# Patient Record
Sex: FEMALE | Race: WHITE
[De-identification: names, ages, dates, MRNs, and addresses within clinical notes are randomized per-mention and may not be internally consistent; named-entity substitution may affect disease eponyms.]

---

## 2017-01-30 ENCOUNTER — HOSPITAL ENCOUNTER (EMERGENCY)
Dept: HOSPITAL 61 - ER | Age: 67
Discharge: HOME | End: 2017-01-30
Payer: MEDICARE

## 2017-01-30 VITALS — WEIGHT: 130 LBS | HEIGHT: 64 IN | BODY MASS INDEX: 22.2 KG/M2

## 2017-01-30 VITALS — DIASTOLIC BLOOD PRESSURE: 69 MMHG | SYSTOLIC BLOOD PRESSURE: 159 MMHG

## 2017-01-30 DIAGNOSIS — R50.9: Primary | ICD-10-CM

## 2017-01-30 DIAGNOSIS — R41.82: ICD-10-CM

## 2017-01-30 DIAGNOSIS — E11.9: ICD-10-CM

## 2017-01-30 DIAGNOSIS — I10: ICD-10-CM

## 2017-01-30 DIAGNOSIS — Z95.1: ICD-10-CM

## 2017-01-30 LAB
ALBUMIN SERPL-MCNC: 3.5 G/DL (ref 3.4–5)
ALP SERPL-CCNC: 60 U/L (ref 46–116)
ALT SERPL-CCNC: 23 U/L (ref 14–59)
ANION GAP SERPL CALC-SCNC: 8 MMOL/L (ref 6–14)
APTT BLD: 33 SEC (ref 24–38)
AST SERPL-CCNC: 24 U/L (ref 15–37)
BACTERIA #/AREA URNS HPF: (no result) /HPF
BARBITURATES UR-MCNC: (no result) UG/ML
BASOPHILS # BLD AUTO: 0 X10^3/UL (ref 0–0.2)
BASOPHILS NFR BLD: 0 % (ref 0–3)
BENZODIAZ UR-MCNC: (no result) UG/L
BILIRUB DIRECT SERPL-MCNC: 0.1 MG/DL (ref 0–0.2)
BILIRUB SERPL-MCNC: 0.5 MG/DL (ref 0.2–1)
BILIRUB UR QL STRIP: NEGATIVE
BUN SERPL-MCNC: 11 MG/DL (ref 7–20)
CALCIUM SERPL-MCNC: 8.8 MG/DL (ref 8.5–10.1)
CANNABINOIDS UR-MCNC: (no result) UG/L
CHLORIDE SERPL-SCNC: 97 MMOL/L (ref 98–107)
CO2 SERPL-SCNC: 30 MMOL/L (ref 21–32)
COCAINE UR-MCNC: (no result) NG/ML
CREAT SERPL-MCNC: 0.9 MG/DL (ref 0.6–1)
EOSINOPHIL NFR BLD: 0 % (ref 0–3)
ERYTHROCYTE [DISTWIDTH] IN BLOOD BY AUTOMATED COUNT: 13.1 % (ref 11.5–14.5)
ETHANOL, URINE: (no result)
GFR SERPLBLD BASED ON 1.73 SQ M-ARVRAT: 62.5 ML/MIN
GLUCOSE SERPL-MCNC: 235 MG/DL (ref 70–99)
GLUCOSE UR STRIP-MCNC: 500 MG/DL
HCT VFR BLD CALC: 37.1 % (ref 36–47)
HGB BLD-MCNC: 12.5 G/DL (ref 12–15.5)
INR PPP: 1.1 (ref 0.8–1.1)
LYMPHOCYTES # BLD: 0.6 X10^3/UL (ref 1–4.8)
LYMPHOCYTES NFR BLD AUTO: 11 % (ref 24–48)
MCH RBC QN AUTO: 30 PG (ref 25–35)
MCHC RBC AUTO-ENTMCNC: 34 G/DL (ref 31–37)
MCV RBC AUTO: 89 FL (ref 79–100)
METHADONE SERPL-MCNC: (no result) NG/ML
MONOCYTES NFR BLD: 9 % (ref 0–9)
NEUTROPHILS NFR BLD AUTO: 80 % (ref 31–73)
NITRITE UR QL STRIP: POSITIVE
OBC FLU: (no result)
OPIATES UR-MCNC: (no result) NG/ML
PCP SERPL-MCNC: (no result) MG/DL
PH UR STRIP: 5.5 [PH]
PLATELET # BLD AUTO: 166 X10^3/UL (ref 140–400)
POTASSIUM SERPL-SCNC: 3.9 MMOL/L (ref 3.5–5.1)
PROT SERPL-MCNC: 7.6 G/DL (ref 6.4–8.2)
PROT UR STRIP-MCNC: NEGATIVE MG/DL
PROTHROMBIN TIME: 13.8 SEC (ref 11.7–14)
RBC # BLD AUTO: 4.19 X10^6/UL (ref 3.5–5.4)
RBC #/AREA URNS HPF: (no result) /HPF (ref 0–2)
SODIUM SERPL-SCNC: 135 MMOL/L (ref 136–145)
SP GR UR STRIP: 1.01
SQUAMOUS #/AREA URNS LPF: (no result) /LPF
UROBILINOGEN UR-MCNC: 0.2 MG/DL
WBC # BLD AUTO: 4.9 X10^3/UL (ref 4–11)
WBC #/AREA URNS HPF: (no result) /HPF (ref 0–4)

## 2017-01-30 PROCEDURE — 71010: CPT

## 2017-01-30 PROCEDURE — 87086 URINE CULTURE/COLONY COUNT: CPT

## 2017-01-30 PROCEDURE — 93005 ELECTROCARDIOGRAM TRACING: CPT

## 2017-01-30 PROCEDURE — 87804 INFLUENZA ASSAY W/OPTIC: CPT

## 2017-01-30 PROCEDURE — 96365 THER/PROPH/DIAG IV INF INIT: CPT

## 2017-01-30 PROCEDURE — 96361 HYDRATE IV INFUSION ADD-ON: CPT

## 2017-01-30 PROCEDURE — 80076 HEPATIC FUNCTION PANEL: CPT

## 2017-01-30 PROCEDURE — 85027 COMPLETE CBC AUTOMATED: CPT

## 2017-01-30 PROCEDURE — 70450 CT HEAD/BRAIN W/O DYE: CPT

## 2017-01-30 PROCEDURE — 36415 COLL VENOUS BLD VENIPUNCTURE: CPT

## 2017-01-30 PROCEDURE — 80048 BASIC METABOLIC PNL TOTAL CA: CPT

## 2017-01-30 PROCEDURE — 85610 PROTHROMBIN TIME: CPT

## 2017-01-30 PROCEDURE — 81001 URINALYSIS AUTO W/SCOPE: CPT

## 2017-01-30 PROCEDURE — 84484 ASSAY OF TROPONIN QUANT: CPT

## 2017-01-30 PROCEDURE — 85730 THROMBOPLASTIN TIME PARTIAL: CPT

## 2017-01-30 NOTE — ED.ADGEN
Past Medical History


Past Medical History:  Diabetes-Type II, Hypertension


Past Surgical History:  Coronary Bypass Surgery


Alcohol Use:  None


Drug Use:  None





Adult General


Chief Complaint


Chief Complaint:  FEVER





HPI


HPI


Patient is a 66  year old female presents emergency department accompanied by 

her friend for fever and confusion. Patient was at work today and coworkers 

noticed that she had increased confusion. She was found to have a fever. She 

only tells me that she has not been feeling well but denies any other complaint 

at this time. I do not find her to be a reliable historian at the moment and 

her friend agrees.





Review of Systems


Review of Systems


Constitutional:  Denies fever or chills. []


Eyes:  Denies change in visual acuity. []


HENT:  Denies nasal congestion or sore throat. [] 


Respiratory:  Denies cough or shortness of breath. [] 


Cardiovascular:  Denies chest pain or edema. [] 


GI:  Denies abdominal pain, nausea, vomiting, bloody stools or diarrhea. [] 


:  Denies dysuria. [] 


Musculoskeletal:  Denies back pain or joint pain. [] 


Integument:  Denies rash. [] 


Neurologic:  Denies headache, focal weakness or sensory changes. [] 


Endocrine:  Denies polyuria or polydipsia. [] 


Lymphatic:  Denies swollen glands. [] 


Psychiatric:  Denies depression or anxiety. []





Current Medications


Current Medications





 Current Medications








 Medications


  (Trade)  Dose


 Ordered  Sig/Corrie  Start Time


 Stop Time Status Last Admin


Dose Admin


 


 Acetaminophen


  (Tylenol)  1,000 mg  1X  ONCE  1/30/17 17:45


 1/30/17 17:46 DC 1/30/17 18:33


1,000 MG


 


 Ceftriaxone Sodium


  (Rocephin 1gm


 Ivpb For Omni)  50 ml @ 


 100 mls/hr  1X  ONCE  1/30/17 19:00


 1/30/17 19:29 DC 1/30/17 19:12


100 MLS/HR


 


 Ibuprofen


  (Motrin)  800 mg  1X  ONCE  1/30/17 19:15


 1/30/17 19:16 DC 1/30/17 19:12


800 MG


 


 Oseltamivir


 Phosphate 75 mg  75 mg  1X  STAT  1/30/17 18:41


 1/30/17 18:43 DC 1/30/17 19:00


75 MG


 


 Sodium Chloride


  (Iv Sodium


 Chloride 0.9%


 1000ml Bag)  1,000 ml @ 


 1,000 mls/hr  Q1H  1/30/17 17:39


 1/30/17 18:38 DC 1/30/17 18:33


1,000 MLS/HR











Allergies


Allergies





 Allergies








Coded Allergies Type Severity Reaction Last Updated Verified


 


  No Known Drug Allergies    8/18/15 No











Physical Exam


Physical Exam





Constitutional: Well developed, well nourished, no acute distress, non-toxic 

appearance. []


HENT: Normocephalic, atraumatic, bilateral external ears normal, oropharynx 

moist, no oral exudates, nose normal. []


Eyes: PERRLA, EOMI, conjunctiva normal, no discharge. [] 


Neck: Normal range of motion, no tenderness, supple, no stridor. [] 


Cardiovascular:Heart rate regular rhythm, no murmur []


Lungs & Thorax:  Bilateral breath sounds clear to auscultation []


Abdomen: Bowel sounds normal, soft, no tenderness, no masses, no pulsatile 

masses. [] 


Skin: Warm, dry, no erythema, no rash. [] 


Back: No tenderness, no CVA tenderness. [] 


Extremities: No tenderness, no cyanosis, no clubbing, ROM intact, no edema. [] 


Neurologic: Alert and oriented X 1, normal motor function, normal sensory 

function, no focal deficits noted. []


Psychologic: Affect normal, judgement confused, mood normal. []





Current Patient Data


Vital Signs





 Vital Signs








  Date Time  Temp Pulse Resp B/P Pulse Ox O2 Delivery O2 Flow Rate FiO2


 


1/30/17 20:08 100.6       





 100.6       


 


1/30/17 19:38  92 18 167/74 97 Nasal Cannula 2 








Lab Values





 Laboratory Tests








Test


  1/30/17


17:29 1/30/17


18:00 1/30/17


18:30 1/30/17


18:36


 


White Blood Count


  4.9x10^3/uL


(4.0-11.0) 


  


  


 


 


Red Blood Count


  4.19x10^6/uL


(3.50-5.40) 


  


  


 


 


Hemoglobin


  12.5g/dL


(12.0-15.5) 


  


  


 


 


Hematocrit


  37.1%


(36.0-47.0) 


  


  


 


 


Mean Corpuscular Volume 89fL ()     


 


Mean Corpuscular Hemoglobin 30pg (25-35)     


 


Mean Corpuscular Hemoglobin


Concent 34g/dL (31-37)


  


  


  


 


 


Red Cell Distribution Width


  13.1%


(11.5-14.5) 


  


  


 


 


Platelet Count


  166x10^3/uL


(140-400) 


  


  


 


 


Neutrophils (%) (Auto) 80% (31-73)  H   


 


Lymphocytes (%) (Auto) 11% (24-48)  L   


 


Monocytes (%) (Auto) 9% (0-9)     


 


Eosinophils (%) (Auto) 0% (0-3)     


 


Basophils (%) (Auto) 0% (0-3)     


 


Neutrophils # (Auto)


  3.9x10^3uL


(1.8-7.7) 


  


  


 


 


Lymphocytes # (Auto)


  0.6x10^3/uL


(1.0-4.8)  L 


  


  


 


 


Monocytes # (Auto)


  0.4x10^3/uL


(0.0-1.1) 


  


  


 


 


Eosinophils # (Auto)


  0.0x10^3/uL


(0.0-0.7) 


  


  


 


 


Basophils # (Auto)


  0.0x10^3/uL


(0.0-0.2) 


  


  


 


 


Sodium Level


  135mmol/L


(136-145)  L 


  


  


 


 


Potassium Level


  3.9mmol/L


(3.5-5.1) 


  


  


 


 


Chloride Level


  97mmol/L


()  L 


  


  


 


 


Carbon Dioxide Level


  30mmol/L


(21-32) 


  


  


 


 


Anion Gap 8 (6-14)     


 


Blood Urea Nitrogen


  11mg/dL (7-20)


  


  


  


 


 


Creatinine


  0.9mg/dL


(0.6-1.0) 


  


  


 


 


Estimated GFR


(Cockcroft-Gault) 62.5  


  


  


  


 


 


Glucose Level


  235mg/dL


(70-99)  H 


  


  


 


 


Calcium Level


  8.8mg/dL


(8.5-10.1) 


  


  


 


 


Total Bilirubin


  0.5mg/dL


(0.2-1.0) 


  


  


 


 


Direct Bilirubin


  0.1mg/dL


(0.0-0.2) 


  


  


 


 


Aspartate Amino Transferase


(AST) 24U/L (15-37)  


  


  


  


 


 


Alanine Aminotransferase (ALT) 23U/L (14-59)     


 


Alkaline Phosphatase


  60U/L ()


  


  


  


 


 


Troponin I Quantitative


  < 0.017ng/mL


(0.000-0.055) 


  


  


 


 


Total Protein


  7.6g/dL


(6.4-8.2) 


  


  


 


 


Albumin


  3.5g/dL


(3.4-5.0) 


  


  


 


 


Influenza Type A Antigen


  


  Positive


(NEGATIVE) 


  


 


 


Influenza Type B Antigen


  


  Negative


(NEGATIVE) 


  


 


 


Urine Collection Type   U cath   


 


Urine Color   Yellow   


 


Urine Clarity   Clear   


 


Urine pH   5.5   


 


Urine Specific Gravity   1.010   


 


Urine Protein


  


  


  Negativemg/dL


(NEG-TRACE) 


 


 


Urine Glucose (UA)


  


  


  500mg/dL (NEG)


  


 


 


Urine Ketones (Stick)


  


  


  Negativemg/dL


(NEG) 


 


 


Urine Blood   Small (NEG)   


 


Urine Nitrite


  


  


  Positive (NEG)


  


 


 


Urine Bilirubin


  


  


  Negative (NEG)


  


 


 


Urine Urobilinogen Dipstick


  


  


  0.2mg/dL (0.2


mg/dL) 


 


 


Urine Leukocyte Esterase   Trace (NEG)   


 


Urine RBC   Occ/HPF (0-2)   


 


Urine WBC   1-4/HPF (0-4)   


 


Urine Squamous Epithelial


Cells 


  


  Few/LPF  


  


 


 


Urine Transitional Epithelial


Cells 


  


  Occ/LPF  


  


 


 


Urine Bacteria


  


  


  Many/HPF


(0-FEW) 


 


 


Urine Mucus   Slight/LPF   


 


Urine Opiates Screen   Pos (NEG)   


 


Urine Methadone Screen   Neg (NEG)   


 


Urine Barbiturates   Neg (NEG)   


 


Urine Phencyclidine Screen   Neg (NEG)   


 


Urine


Amphetamine/Methamphetamine 


  


  Neg (NEG)  


  


 


 


Urine Benzodiazepines Screen   Neg (NEG)   


 


Urine Cocaine Screen   Neg (NEG)   


 


Urine Cannabinoids Screen   Neg (NEG)   


 


Urine Ethyl Alcohol   Neg (NEG)   


 


Prothrombin Time


  


  


  


  13.8SEC


(11.7-14.0)


 


Prothrombin Time INR    1.1 (0.8-1.1)  


 


PTT    33SEC (24-38)  





 Laboratory Tests


1/30/17 17:29








 Laboratory Tests


1/30/17 17:29














EKG


EKG


EKG interpreted by me, 98 beats for minute, normal sinus rhythm, no ST segment 

elevation normal axis. []





Radiology/Procedures


Radiology/Procedures


Chest x-ray interpreted by me, no acute cardiopulmonary process.








CT head without contrast 


Indication: Altered mental status. 


Axial imaging through the brain was performed without contrast. 


The ventricles and sulci are within normal limits. No sulcal effacement, 


midline shift or hemorrhage is seen. There is moderate periventricular 


hypodensity noted consistent with chronic microvascular ischemia. The 


cisterns are patent. The visualized paranasal sinuses are clear. 


Impression: No acute intracranial process is detected. 


 


Electronically signed by: Daniel Lynch MD (Jan 30, 2017 18:33:46)














DICTATED and SIGNED BY:     DANIEL LYNCH MD


DATE:     01/30/17 1832





CC: LEANA PALM MD; SARINA TORRE MD ~[]





Course & Med Decision Making


Course & Med Decision Making


Pertinent Labs and Imaging studies reviewed. (See chart for details)


She is positive for influenza. Urine is also suggestive of a urinary tract 

infection. We have repeated her with IV fluids, Tylenol, ibuprofen, and Tamiflu 

as well as a dose of Rocephin.





As her temperature has gone down the patient has returned to her normal 

baseline mental status. Son and  are now in the room with her. She lives 

with them. They would like to take her home and attempt outpatient treatment. I 

did stress to them that they should have a low threshold for returning to the 

emergency department to get her admitted. We spent a significant amount time 

talking about supportive care and return precautions.


[]





Dragon Disclaimer


Dragon Disclaimer


This electronic medical record was generated, in whole or in part, using a 

voice recognition dictation system.








LEANA PALM MD Jan 30, 2017 19:16

## 2017-01-30 NOTE — EKG
[facility]

              [address] [place] [address]

Test Date:    2017               Test Time:    17:57:17

Pat Name:     LAKESHIA FUENTES             Department:   

Patient ID:   [id number]           Room:          

Gender:       F                        Technician:   

:          [id number]               Requested By: LEANA PALM

Order Number: [id number]            Reading MD:   Gerardo Chambers

                                 Measurements

Intervals                              Axis          

Rate:         98                       P:            37

WY:           174                      QRS:          14

QRSD:         72                       T:            24

QT:           304                                    

QTc:          390                                    

                           Interpretive Statements

SINUS RHYTHM

NON-SPECIFIC ST/T CHANGES

Electronically Signed On 2017 7:45:21 CST by Gerardo Chambers

## 2017-01-30 NOTE — RAD
CT head without contrast 

Indication: Altered mental status. 

Axial imaging through the brain was performed without contrast. 

The ventricles and sulci are within normal limits. No sulcal effacement, 

midline shift or hemorrhage is seen. There is moderate periventricular 

hypodensity noted consistent with chronic microvascular ischemia. The 

cisterns are patent. The visualized paranasal sinuses are clear. 

Impression: No acute intracranial process is detected. 

 

Electronically signed by: Daniel Lynch MD (Jan 30, 2017 18:33:46)

## 2017-01-31 NOTE — RAD
Portable chest, 1/30/2017:



History: Fever, altered mental status



Comparison is made to a study from 5/5/2013. The heart size and pulmonary

vascularity are normal. There is calcific plaquing of the aorta. No pulmonary

infiltrates are seen. There is no evidence of pleural fluid. Moderate spurring

is present in the spine.



IMPRESSION: No acute cardiopulmonary abnormality is detected.

## 2019-03-22 ENCOUNTER — HOSPITAL ENCOUNTER (OUTPATIENT)
Dept: HOSPITAL 63 - SURG | Age: 69
Discharge: HOME | End: 2019-03-22
Attending: INTERNAL MEDICINE
Payer: COMMERCIAL

## 2019-03-22 VITALS — SYSTOLIC BLOOD PRESSURE: 148 MMHG | DIASTOLIC BLOOD PRESSURE: 87 MMHG

## 2019-03-22 DIAGNOSIS — K44.9: ICD-10-CM

## 2019-03-22 DIAGNOSIS — Z96.649: ICD-10-CM

## 2019-03-22 DIAGNOSIS — E11.9: ICD-10-CM

## 2019-03-22 DIAGNOSIS — I10: ICD-10-CM

## 2019-03-22 DIAGNOSIS — Z79.84: ICD-10-CM

## 2019-03-22 DIAGNOSIS — K22.2: Primary | ICD-10-CM

## 2019-03-22 DIAGNOSIS — K29.00: ICD-10-CM

## 2019-03-22 DIAGNOSIS — K21.0: ICD-10-CM

## 2019-03-22 DIAGNOSIS — Z98.890: ICD-10-CM

## 2019-03-22 DIAGNOSIS — Z90.710: ICD-10-CM

## 2019-03-22 DIAGNOSIS — Z79.899: ICD-10-CM

## 2019-03-22 DIAGNOSIS — K29.50: ICD-10-CM

## 2019-03-22 DIAGNOSIS — I25.2: ICD-10-CM

## 2019-03-22 PROCEDURE — 82947 ASSAY GLUCOSE BLOOD QUANT: CPT

## 2019-03-22 PROCEDURE — 88305 TISSUE EXAM BY PATHOLOGIST: CPT

## 2019-03-22 PROCEDURE — 43450 DILATE ESOPHAGUS 1/MULT PASS: CPT

## 2019-03-22 PROCEDURE — 43239 EGD BIOPSY SINGLE/MULTIPLE: CPT

## 2019-03-22 PROCEDURE — 88342 IMHCHEM/IMCYTCHM 1ST ANTB: CPT

## 2019-03-26 NOTE — PATHOLOGY
[facility] Accession Number: [id number]

.                                                                01

Material submitted:                                        .

ANTRUM BX

.                                                                01

Clinical history:                                          .

None provided

.                                                                02

**********************************************************************

Diagnosis:

Gastric biopsy, antrum:

- Chronic gastritis, mild, focally active.

(JPM:licha; 03/26/2019)

QMS/03/26/2019

**********************************************************************

.                                                                02

Comment:

Sections of the gastric antral biopsy show congestion and focally active

mild chronic inflammation.  A properly controlled immunoperoxidase stain

for Helicobacter is negative for Helicobacter organisms.  There is no

evidence of malignancy.

(JPM:licha; 03/26/2019)

.

.

Special stain:  Immunoperoxidase stain for Helicobacter.

.                                                                02

Electronically signed:                                     .

Epifanio Hennessy MD, Pathologist

NPI- [id number]

.                                                                01

Gross description:                                         .

Received in formalin labeled "Donnie Duvall, antrum BX," are two segments

of tan-brown soft tissue measuring 0.2 x 0.1 x 0.1 cm and 0.3 x 0.3 x 0.3

cm in greatest dimensions.  The specimen is submitted entirely in cassette

A1.

([facility]; 3/25/2019)

XDC/XDC

.                                                                02

Pathologist provided ICD-10:

K29.50

.                                                                02

CPT                                                        .

784081, [id number]

Specimen Comment: A courtesy copy of this report has been sent to

Specimen Comment: [phone], [phone].

Specimen Comment: Report sent to  / DR FU

Specimen Comment: A duplicate report has been generated due to demographic updates.

***Performed at:  01

   [address], [place]  [id number]

   MD Pantera Knight MD Phone:  [phone]

***Performed at:  02

   [facility]

   [address] [place], [place]  [id number]

   MD Epifanio Hennessy MD Phone:  [phone]